# Patient Record
(demographics unavailable — no encounter records)

---

## 2024-10-21 NOTE — HISTORY OF PRESENT ILLNESS
[FreeTextEntry1] : follow up [de-identified] : Mr. Kai Zuluaga is a 41 yo male presents today for routine follow up. Pt is due for his comprehensive labs, to be ordered today recommended to return next month for CPE visit. Pt continues to also follow up with psychiatrist, Dr. Bernard Burnett, currently managing his hx of anxiety/panic attack, bipolar II, currently doing well, symptoms stable. Pt seeks also lab work to check Depakote level. Pt today denies, fever, chills, n/v/c/d. Pt interested in flu vaccine today.

## 2024-10-21 NOTE — REVIEW OF SYSTEMS
[Negative] : Heme/Lymph [Fever] : no fever [Chills] : no chills [Fatigue] : no fatigue [Earache] : no earache [Hearing Loss] : no hearing loss [Nosebleeds] : no nosebleeds [Postnasal Drip] : no postnasal drip [Nasal Discharge] : no nasal discharge [Sore Throat] : no sore throat [Hoarseness] : no hoarseness [Joint Pain] : no joint pain [Joint Stiffness] : no joint stiffness [Muscle Pain] : no muscle pain [Back Pain] : no back pain [Itching] : no itching [Skin Rash] : no skin rash [Headache] : no headache [Insomnia] : no insomnia [Anxiety] : no anxiety [Depression] : no depression

## 2024-10-21 NOTE — PHYSICAL EXAM
[No Acute Distress] : no acute distress [Well Nourished] : well nourished [EOMI] : extraocular movements intact [Normal Outer Ear/Nose] : the outer ears and nose were normal in appearance [Supple] : supple [Clear to Auscultation] : lungs were clear to auscultation bilaterally [Normal S1, S2] : normal S1 and S2 [No Edema] : there was no peripheral edema [Non Tender] : non-tender [No Spinal Tenderness] : no spinal tenderness [No Joint Swelling] : no joint swelling [No Rash] : no rash [Normal Gait] : normal gait [Normal Affect] : the affect was normal

## 2024-10-21 NOTE — ASSESSMENT
[FreeTextEntry1] : Approximately 30 minutes was involved in this patient's care, including but not limited to preparing to see the patient, reviewing and obtaining the past and interval history, including medications, reviewing relevant testing, documenting clinical information, ordering of appropriate medications and testing, and coordinating medical care, including communicating with professionals involved in the care of the patient.

## 2024-10-23 NOTE — REVIEW OF SYSTEMS
[Fever] : no fever [Chills] : no chills [Fatigue] : no fatigue [Earache] : no earache [Hearing Loss] : no hearing loss [Nosebleeds] : no nosebleeds [Postnasal Drip] : no postnasal drip [Nasal Discharge] : no nasal discharge [Sore Throat] : no sore throat [Hoarseness] : no hoarseness [Joint Pain] : no joint pain [Joint Stiffness] : no joint stiffness [Muscle Pain] : no muscle pain [Back Pain] : no back pain [Itching] : no itching [Skin Rash] : no skin rash [Headache] : no headache [Insomnia] : no insomnia [Anxiety] : no anxiety [Depression] : no depression [Negative] : Heme/Lymph

## 2024-10-23 NOTE — ASSESSMENT
[FreeTextEntry1] : completed physical exam, blood work and urinalysis ordered today, will follow up accordingly. HCM: due for new colonoscopy, referral to GI provided pt seeks referral to dermatology for skin cancer screening and referral to pulmonary, hx of smoker, ground glass opacities in the lung, referral for both provided vaccines: flu vaccine up to date. covid vaccine pharmacy Tdap deferred vitals stable, ecg stable no acute new health concerns today.

## 2024-10-23 NOTE — HISTORY OF PRESENT ILLNESS
[FreeTextEntry1] : comprehensive visit [de-identified] : Mr. Kai Zuluaga is a 41 yo male presents today for annual comprehensive exam and labs. Lab script already ordered, pending completions.  Pt today denies any fever, chills, n/v/c/d

## 2024-10-23 NOTE — ASSESSMENT
[FreeTextEntry1] : NOEMI SANTOYO is a 40 year old man with recurrent b/l inflammatory eye disease and has been unable to taper off ocular steroids, prolonged AM stiffness and pain in low back with prolonged immobility, sciatica and cervical radiculopathy, SI joint TTP and b/l Achilles TTP, some small joint worsening arthralgias and synovitis. Additionally with dry eyes by Schirmer, dry skin, chronic diarrhea. Serological w/u negative for lupus/Sjogrens, ESR/CRP negative. MRI L spine with DJD which could be cause of his sx, asymmetrical SI joint involvement as well but not classic pattern for inflammatory axial disease. However given overall worsening clinical status I am suspicious for an inflammatory process, possibly seronegative arthritis/eye disease and dry eyes may be exacerbated by chronic inflammation and steroid drops. GI sx appear related to IBS not IBD.   # seronegative inflammatory process with peripheral and axial involvement as well as iritis in the past. Not seen in some time so off meds and presently not symptomatic.  - will monitor off meds (MTX 2/2 LFTs prior, Humira) at present time  - repeat inflammatory markers with PMD labs and check Quant/Hep panel in case DMARDs/biologics need to be resumed  - c/w stretching for low back pain sx - MRI L shoulder for ongoing pain/slightly limited ROM 2/2 injury reviewed - no inflammatory features - f/u ortho   # Sicca/ clinical Sjogrens  - c/w conservative measures for sicca PRN -- AT q6 prn for eyes, warm compresses, gel drops QHS, humidified air QHS. Biotene spray, sugar free lozenges, PO hydration for mouth. Ceramide containing lotion post-bathing to retain moisture.  - q6 months dental to monitor mouth  - f/u optho as recommended  RTC 12 months, sooner if [place] inflammatory sx recur

## 2024-10-23 NOTE — HEALTH RISK ASSESSMENT
[Good] : ~his/her~  mood as  good [Yes] : Yes [Monthly or less (1 pt)] : Monthly or less (1 point) [1 or 2 (0 pts)] : 1 or 2 (0 points) [Never (0 pts)] : Never (0 points) [No] : In the past 12 months have you used drugs other than those required for medical reasons? No [No falls in past year] : Patient reported no falls in the past year [0] : 2) Feeling down, depressed, or hopeless: Not at all (0) [PHQ-2 Negative - No further assessment needed] : PHQ-2 Negative - No further assessment needed [Never] : Never [YES] : Yes [Have you attended a firearm safety workshop or class?] : A firearm safety workshop or class has been attended. [Patient reported colonoscopy was abnormal] : Patient reported colonoscopy was abnormal [HIV test declined] : HIV test declined [Hepatitis C test declined] : Hepatitis C test declined [None] : None [With Family] : lives with family [Employed] : employed [College] : College [Significant Other] : lives with significant other [Sexually Active] : sexually active [Feels Safe at Home] : Feels safe at home [Fully functional (bathing, dressing, toileting, transferring, walking, feeding)] : Fully functional (bathing, dressing, toileting, transferring, walking, feeding) [Fully functional (using the telephone, shopping, preparing meals, housekeeping, doing laundry, using] : Fully functional and needs no help or supervision to perform IADLs (using the telephone, shopping, preparing meals, housekeeping, doing laundry, using transportation, managing medications and managing finances) [Smoke Detector] : smoke detector [Carbon Monoxide Detector] : carbon monoxide detector [Safety elements used in home] : safety elements used in home [Seat Belt] :  uses seat belt [Sunscreen] : uses sunscreen [With Patient/Caregiver] : , with patient/caregiver [Reviewed no changes] : Reviewed, no changes [Name: ___] : Health Care Proxy's Name: [unfilled]  [Relationship: ___] : Relationship: [unfilled] [Aggressive treatment] : aggressive treatment [de-identified] : social [Audit-CScore] : 1 [[id number]] : 0 [Are there any unlocked firearms stored in your household?] : No unlocked firearms in the household. [Are there any firearms stored in your household that are loaded?] : No firearms are stored in the household loaded. [Are there any children in your household?] : No children are in the household. [Has anyone in the household been feeling low/depressed/been struggling?] : No one in the household has been feeling low/depressed/been struggling. [Change in mental status noted] : No change in mental status noted [Language] : denies difficulty with language [Behavior] : denies difficulty with behavior [Learning/Retaining New Information] : denies difficulty learning/retaining new information [Handling Complex Tasks] : denies difficulty handling complex tasks [Reasoning] : denies difficulty with reasoning [Spatial Ability and Orientation] : denies difficulty with spatial ability and orientation [High Risk Behavior] : no high risk behavior [Reports changes in hearing] : Reports no changes in hearing [Reports changes in vision] : Reports no changes in vision [Reports changes in dental health] : Reports no changes in dental health [ColonoscopyDate] : 10/2021 [ColonoscopyComments] : sessile polyp, referral to GI for new colonoscopy.  [de-identified] : dentist twice a year [AdvancecareDate] : 10/23/2024

## 2024-10-23 NOTE — REVIEW OF SYSTEMS
[Arthralgias] : arthralgias [Joint Pain] : joint pain [Joint Stiffness] : joint stiffness [As Noted in HPI] : as noted in HPI [Fever] : no fever [Chills] : no chills [Dry Eyes] : dryness of the eyes [Joint Swelling] : no joint swelling [Negative] : ENT [FreeTextEntry3] : Now more tear production [FreeTextEntry7] : IBS-D

## 2024-10-23 NOTE — HISTORY OF PRESENT ILLNESS
[FreeTextEntry1] : NOEMI SANTOYO is a 38 year old man who presents for rheum evaluation of below sx --   + chronic dry eyes, 10% tear production in R eye, 50% in L eye by Schirmer test, needs eye drops daily  + recurrent b/l chronic iritis, most recently in May 2021, still on tapering eye drops + L sided salivary gland fullness, painless x 2 months  + dry mouth, not as severe as eyes  + chronically dry skin, recent bumpy rash, no inflammatory rashes, no psoriasis  + diarrhea 2-3x every morning x few years, stable, never needed to see GI, + mild GERD, no other GI sx  + diffuse mild arthralgias, Achilles b/l involved  + low back/pelvis and neck prolonged AM stiffness and gelling in lower back, ROM intact, L sided cervical radiculopathy intermittently  + arrhythmia, has been worked up by cards, on BB  + also with chronic sinusitis but remainder of vasculitis ROS negative and improved with conservative measures, no epistaxis or hemoptysis   SLE ROS negative for alopecia, oral ulcers, malar rash, photosensitivity, SOB, chest pain, serositis, abd pain, dysuria, hematuria, rash, hematologic abnormalities, Raynauds. APLS ROS - no thrombotic events.   Inflammatory arthritis ROS negative for symmetrical peripheral joint synovitis, psoriasis/ rashes, diagnosed IBD.   Labs - Negative ESR/CRP, GREGOR, dsDNA, MARSHA, dsDNA, C3/4, Sjogrens, HIV, thyroid Abs, sUA, HCV, UA, RF/CCP MRI LS spine -- mild L5-s1 DJD, nonspecific signal abnormality over R upper SI, L lower SI. R hip minimal OA without effusion.   ----------- 8/11/21 -- Mid-late day persistent low back pain. Worse with prolonged sitting. MSK pain over upper back but intact C spine ROM, no peripheral synovitis. Combination of NSAIDs and muscle relaxer helps but reports tizanidine dose is too low for him and mobic causes some sedation. Has used ibuprofen in the past without sedation or GI SE. No rashes, no further eye inflammation, ongoing GI complaints but has not seen GI yet.   12/21/21-- Punctal plugs partially helping but continued decrease of tear production, ongoing inflammatory changes in b/l eyes and remains on steroid eye drops. Some SQ nodules vs blisters over lateral aspects of some PIPs, fluctuating. Stable LBP pain, saw ortho who found DJD, s/p SELAM without much improvement. Ongoing and worsening SI joint pain, R >>L. Ongoing b/l ankle/Achilles pain and some newer pain in b/l knees, remains without overt effusions/synovitis.   1/25/22 -- Worsening HA approx 2 days after MTX doses, otherwise tolerating well, not sure if it is helping yet. Remains with diffuse arthralgias, back/SI remains most symptomatic even with conservative measures and current meds. Saw Dr Loera recently, colonoscopy without IBD, started on Viberzi for IBS-D with some improvement.   5/6/22 -- Ongoing and worsening LBP since last visit, happening with prolonged sitting as well, steroids provide some improvement but gaining weight 2/2 this, not able to use muscle relaxers with frequency 2/2 sedation. MTX is being tolerated but minimally helpful. Some b/l wrist pain and R lateral epicondylitis x few days. Punctal plugs helping with eyes but occasionally finds it hard to focus, has missed a few optho appointments. Off Viberzi as did not feel it helped. No extra-articular inflammatory sx.   9/27/22 -- Lost insurance a little over 2 months ago so went off all meds. Developed covid while off meds -- mainly GI sx, did not have to be in the hospital, recovered, then developed sinus infection/sore throat/ persistent chills without fevers/myalgias persistent x weeks, now starting to resolve s/p ABx. LBP ongoing, sicca ongoing tho slightly better, remains without [place] synovitis.   9/7/23 -- Ongoing LBP, no inflammatory sx, gene be going for repeat SELAM. Achilles tendonitis chronically but not limiting ADLs/walking. AM stiffness <10 min. Ocular sicca stable.  10/23/24 -- Off all rheum meds for at least 9 months, doesn't feel much worse in the aspects we had been attempting to treat with the meds. 2 episodes of ocular pain since last visit, used 3-4 days of leftover eye drop Rx with benefit, no vision loss, notes improved tears, some mornings getting a lot of tears. Achilles pain not worsened. Remainder of inflammatory ROS negative. Noted GI sx improvement since on Ozempic with MD in [place].  Seeing PMD later today.  Has not had labs since last year.

## 2024-10-23 NOTE — PHYSICAL EXAM
[General Appearance - Alert] : alert [General Appearance - In No Acute Distress] : in no acute distress [General Appearance - Well Nourished] : well nourished [PERRL With Normal Accommodation] : pupils were equal in size, round, and reactive to light [Extraocular Movements] : extraocular movements were intact [Outer Ear] : the ears and nose were normal in appearance [Both Tympanic Membranes Were Examined] : both tympanic membranes were normal [Nasal Cavity] : the nasal mucosa and septum were normal [Oropharynx] : the oropharynx was normal [Neck Appearance] : the appearance of the neck was normal [Neck Cervical Mass (___cm)] : no neck mass was observed [Edema] : there was no peripheral edema [No CVA Tenderness] : no ~M costovertebral angle tenderness [Abnormal Walk] : normal gait [Nail Clubbing] : no clubbing  or cyanosis of the fingernails [Musculoskeletal - Swelling] : no joint swelling seen [Motor Tone] : muscle strength and tone were normal [] : no rash [No Focal Deficits] : no focal deficits [Oriented To Time, Place, And Person] : oriented to person, place, and time [Impaired Insight] : insight and judgment were intact [Affect] : the affect was normal [Respiration, Rhythm And Depth] : normal respiratory rhythm and effort [FreeTextEntry1] : No synovitis/ effusions, no Achilles TTP, ROM diffusely intact except in L shoulder where there is partial limitation 2/2 pain

## 2024-10-28 NOTE — PROCEDURE
[de-identified] : Using sterile technique, 2cc of depomedrol (40mg/ml) and 3cc of 1% plain lidocaine was drawn up into a sterile 5cc syringe.  The posterior lateral corner of the left shoulder was then sterilely prepped with chlorhexidine and ethylene chloride spray was used as an anesthetic prior to injection.  The subacromial space was imaged with the Maurice Lumify ultrasound probe.  The depomedrol/lidocaine mixture was injected into the subacromial space under ultrasound guidance.  A spot image of the injection was saved..  The patient tolerated the procedure well without difficulty.  The patient was given instructions on the use of ice and anti-inflammatories post injection site soreness.

## 2024-10-28 NOTE — HISTORY OF PRESENT ILLNESS
[de-identified] : This patient presents today for follow-up regarding left shoulder strain.  Patient continues to complain of significant pain which she feels is getting worse.  Did have an MRI to evaluate for rotator cuff or labral tearing.  Presents today to review the MRI results.  Pain level is 2-4 out of 10.  Pain worse with activity such as reaching behind the back.  Notes occasional clicking and popping with range of motion.  Does note some weakness intermittently at times.

## 2024-10-28 NOTE — PHYSICAL EXAM
[de-identified] : The patient appears well nourished  and in no apparent distress.  The patient is alert and oriented to person, place, and time.   Affect and mood appear normal.    The head is normocephalic and atraumatic.  The eyes reveal normal sclera and extra ocular muscles are intact.   The neck appears normal with no jugular venous distention or masses noted.   Skin shows normal turgor with no evidence of eczema or psoriasis.  No respiratory distress noted.  The patient ambulates with a normal gait.  The left shoulder has mild loss of range of motion.  Positive impingement sign and positive Gale sign.  Rotator cuff strength normal.  There is no tenderness to palpation.   There is no soft tissue swelling.  There is no eccyhmosis.  There is no warmth or erythema.    There is no instabililty on exam to anterior drawer or load and shift.  No lymphadenopathy or edema is noted.  Pulses and capillary refill are normal.  There is no muscular atrophy.  Strength and sensation are intact distally.   [de-identified] : MRI was reviewed.  There is evidence of a tearing of the posterior superior labrum with biceps tendinosis.

## 2024-10-28 NOTE — DISCUSSION/SUMMARY
[de-identified] : The patient presents today for evaluation of follow-up regarding left shoulder strain.  We did obtain an MRI to rule out labral or rotator cuff tear.  The MRI shows evidence of a SLAP tear with biceps tendinitis.  I discussed the diagnosis and treatment recommendations.  I would like to try an injection into the subacromial space which was performed on today's visit.  Will see him back in 1 week for follow-up and reevaluation.  If he has significant improvement we will start him on a course of therapy and follow him.  If he does not have improvement he may require arthroscopic evaluation of the SLAP tear and possible biceps tenodesis.  At least 30 minutes was spent performing the evaluation and management on today's office visit.  This includes but is not limited to preparing to see patient including review of any test results or outside medical records, obtaining and/or reviewing separately obtained history, performing examination and evaluation, counseling and educating the patient on their diagnosis and treatment recommendations, ordering medications, tests, or procedures, documenting clinical information in the electronic health record, independently interpreting results (not separately reported) and communicating results to the patient, and coordination of care.

## 2024-11-05 NOTE — HISTORY OF PRESENT ILLNESS
[de-identified] : This patient presents today for follow-up regarding left shoulder strain and SLAP tear.  Patient had a cortisone injection approxi-1 week ago and presents today for follow-up.  He feels the injection did not help him much at all.  Continues to complain of pain about the shoulder and is noting loss of motion and stiffness.  Pain level is 4 out of 10.  Denies radicular symptoms numbness and tingling.  Pain worse with activities such as reaching pulling pushing or lifting.

## 2024-11-05 NOTE — PHYSICAL EXAM
[de-identified] : The patient appears well nourished  and in no apparent distress.  The patient is alert and oriented to person, place, and time.   Affect and mood appear normal.    The head is normocephalic and atraumatic.  The eyes reveal normal sclera and extra ocular muscles are intact.   The neck appears normal with no jugular venous distention or masses noted.   Skin shows normal turgor with no evidence of eczema or psoriasis.  No respiratory distress noted.  The patient ambulates with a normal gait.  The left shoulder has increased loss of motion.  His forward flexion is 150 degrees, abduction 80 degrees, abduction internal rotation 20 degrees, external rotation 45 versus 90 on the contralateral side, internal rotation to the lower lumbar spine.  Positive impingement sign and positive Gale sign.  Rotator cuff strength normal.  There is no tenderness to palpation.   There is no soft tissue swelling.  There is no eccyhmosis.  There is no warmth or erythema.    There is no instabililty on exam to anterior drawer or load and shift.  No lymphadenopathy or edema is noted.  Pulses and capillary refill are normal.  There is no muscular atrophy.  Strength and sensation are intact distally.

## 2024-11-05 NOTE — DISCUSSION/SUMMARY
[de-identified] : The patient presents today for follow-up regarding SLAP tear of the left shoulder.  Patient did have a subacromial Jex about a week ago with no improvement.  His physical exam today reveals he is developing adhesive capsulitis.  I discussed the diagnosis with the patient as well as treatment recommendations.  I recommend patient start a course of physical therapy for capsular stretching.  I will see him back in 1 month for follow-up and reevaluation.  Depending upon how he is doing after the month of therapy we will consider continued conservative treatment versus examination under anesthesia manipulation possible capsule release and also evaluation of the SLAP tear.  On today's visit he was given a prescription for therapy.  At least 20 minutes was spent performing the evaluation and management on today's office visit.  This includes but is not limited to preparing to see patient including review of any test results or outside medical records, obtaining and/or reviewing separately obtained history, performing examination and evaluation, counseling and educating the patient on their diagnosis and treatment recommendations, ordering medications, tests, or procedures, documenting clinical information in the electronic health record, independently interpreting results (not separately reported) and communicating results to the patient, and coordination of care.

## 2024-12-23 NOTE — DISCUSSION/SUMMARY
[de-identified] : This patient presents today for follow-up regarding left shoulder strain with adhesive capsulitis.  Patient is noting some slight improvement however his physical exam does not reveal significant improvement in his range of motion.  I recommend he continue physical therapy at least twice a week and do home exercises on a daily basis.  I will see him back in 1 month for follow-up and reevaluation.  At least 20 minutes was spent performing the evaluation and management on today's office visit.  This includes but is not limited to preparing to see patient including review of any test results or outside medical records, obtaining and/or reviewing separately obtained history, performing examination and evaluation, counseling and educating the patient on their diagnosis and treatment recommendations, ordering medications, tests, or procedures, documenting clinical information in the electronic health record, independently interpreting results (not separately reported) and communicating results to the patient, and coordination of care.

## 2024-12-23 NOTE — HISTORY OF PRESENT ILLNESS
[de-identified] : This patient presents today for follow-up and reevaluation regarding left shoulder strain and adhesive capsulitis.  He has been going to therapy but not that often as he was in [place].  He is been doing some home exercises well.  He presents today for follow-up and reevaluation.  Pain level 4 out of 10.  Denies numbness and tingling or radicular symptoms.  He presents today for reevaluation

## 2024-12-23 NOTE — PHYSICAL EXAM
[de-identified] : The patient appears well nourished  and in no apparent distress.  The patient is alert and oriented to person, place, and time.   Affect and mood appear normal.    The head is normocephalic and atraumatic.  The eyes reveal normal sclera and extra ocular muscles are intact.   The neck appears normal with no jugular venous distention or masses noted.   Skin shows normal turgor with no evidence of eczema or psoriasis.  No respiratory distress noted.  The patient ambulates with a normal gait.  The left shoulder has increased loss of motion.  His forward flexion is 150 degrees, abduction 80 degrees, abduction internal rotation 20 degrees, external rotation 45 versus 90 on the contralateral side, internal rotation to the thoracolumbar junction.  Positive impingement sign and positive Gale sign.  Rotator cuff strength normal.  There is no tenderness to palpation.   There is no soft tissue swelling.  There is no eccyhmosis.  There is no warmth or erythema.    There is no instabililty on exam to anterior drawer or load and shift.  No lymphadenopathy or edema is noted.  Pulses and capillary refill are normal.  There is no muscular atrophy.  Strength and sensation are intact distally.

## 2024-12-23 NOTE — REASON FOR VISIT
[Follow-Up Visit] : a follow-up visit for [Shoulder Pain] : shoulder pain [FreeTextEntry2] : follow up evaluation of adhesive capsulitis of the left shoulder/SLAP tear of shoulder.

## 2025-01-22 NOTE — PLAN
[No] : No [FreeTextEntry4] : meeting goals of care.  [FreeTextEntry5] : Continue VPA and Latuda for mood, and Klonopin for anxiety and sleep, helped with long flight, pt does not take this regularly.

## 2025-01-22 NOTE — PHYSICAL EXAM
[Well groomed] : well groomed [Cooperative] : cooperative [Euthymic] : euthymic [Clear] : clear [Linear/Goal Directed] : linear/goal directed [None] : none [None Reported] : none reported [Average] : average [WNL] : within normal limits [FreeTextEntry1] : weight less than on last exam.  [de-identified] : constricted as a baseline

## 2025-01-22 NOTE — REASON FOR VISIT
[Continuing, patient seen in-person within last 12 months] : Telehealth services are continuing as patient has been seen in-person within last 12 months. [Telehealth (audio & video) - Individual/Group] : This visit was provided via telehealth using real-time 2-way audio visual technology. [Medical Office: ([facility])___] : The provider was located at the medical office in [unfilled]. [Home] : The patient, [unfilled], was located at home, [unfilled], at the time of the visit. [Patient's space is appropriate for telehealth and maintains privacy/confidentiality.] : Patient's space is appropriate for telehealth and maintains privacy/confidentiality. [Participant(s) identity verified] : Participant(s) identity verified. [For new patient(s) – practitioner identifies themselves to participants] : Practitioner identifies themselves to participants. [Verbal consent obtained from patient/other participant(s)] : Verbal consent for telehealth/telephonic services obtained from patient/other participant(s) [Patient] : Patient [FreeTextEntry1] : Pt here for management of mood disorder.

## 2025-01-22 NOTE — HISTORY OF PRESENT ILLNESS
[FreeTextEntry1] : Pt reports that he socially used alcohol, but not to excess.  Pt with some change in circadian rhythms due to travel.  Pt denied AH/VH/delusions, denied s/e, denied racing thoughts.  Pt denied depression and met treatment goals of relaxing and self care.  Pt is medication adherent, will continue current management, clinically stable.

## 2025-03-26 NOTE — PHYSICAL EXAM
[Well groomed] : well groomed [Cooperative] : cooperative [Euthymic] : euthymic [Clear] : clear [Linear/Goal Directed] : linear/goal directed [None] : none [None Reported] : none reported [Average] : average [WNL] : within normal limits [FreeTextEntry1] : weight less than on last exam.  [de-identified] : constricted as a baseline

## 2025-03-26 NOTE — PHYSICAL EXAM
[Well groomed] : well groomed [Cooperative] : cooperative [Euthymic] : euthymic [Clear] : clear [Linear/Goal Directed] : linear/goal directed [None] : none [None Reported] : none reported [Average] : average [WNL] : within normal limits [FreeTextEntry1] : weight less than on last exam.  [de-identified] : constricted as a baseline

## 2025-03-26 NOTE — PLAN
[No] : No [Medication education provided] : Medication education provided. [Rationale for medication choices, possible risks/precautions, benefits, alternative treatment choices, and consequences of non-treatment discussed] : Rationale for medication choices, possible risks/precautions, benefits, alternative treatment choices, and consequences of non-treatment discussed with patient/family/caregiver  [FreeTextEntry4] : Meeting goals of care but has significant psychosocial stress.  [FreeTextEntry5] : Pt advised to follow up with PCP for labs, had slightly elevated LFT's in October of 2024, want to measure VPA level as well.  A	Y	N	B	12/30/2024	12/30/2024	clonazepam 0.5 mg tablet	60	30	Leslye Wiseman MD	[id number]	[facility] [place] [id number] Plan to get VPA level, have routine labs + sed rate done.  Continue Depakote 500 mg daily for mood stabilization.  Pt to increase Klonopin to 1.5 mg at bedtime. for sleep and anxiety Continue Latuda, but increase dose to 40 mg. for adjunct to VPA for mood stabilization.

## 2025-03-26 NOTE — PLAN
[No] : No [Medication education provided] : Medication education provided. [Rationale for medication choices, possible risks/precautions, benefits, alternative treatment choices, and consequences of non-treatment discussed] : Rationale for medication choices, possible risks/precautions, benefits, alternative treatment choices, and consequences of non-treatment discussed with patient/family/caregiver  [FreeTextEntry4] : Meeting goals of care but has significant psychosocial stress.  [FreeTextEntry5] : Pt advised to follow up with PCP for labs, had slightly elevated LFT's in October of 2024, want to measure VPA level as well.  A	Y	N	B	12/30/2024	12/30/2024	clonazepam 0.5 mg tablet	60	30	Leslye Wiseman MD	[id number]	[facility] #[id number] Plan to get VPA level, have routine labs + sed rate done.  Continue Depakote 500 mg daily for mood stabilization.  Pt to increase Klonopin to 1.5 mg at bedtime. for sleep and anxiety Continue Latuda, but increase dose to 40 mg. for adjunct to VPA for mood stabilization.

## 2025-03-26 NOTE — HISTORY OF PRESENT ILLNESS
[FreeTextEntry1] : Pt reports his significant other is 10 weeks pregnant, he states he feels overwhelmed, he is feeling nauseous.  Pt reports he can fall asleep after taking Klonopin, sleeps for a few hours and then has interrupted sleep.  Pt reports he has been adherent with VPA. Pt states he had run out of Latuda for the past few days. He states heart racing, palpitations, and the sense of chest pressure has been consistent. Pt reports he has had a few depressive days where he wants to stay in bed and feels achy and "inflamed." Pt states he has stopped smoking, no other substance use issues. Pt is taking 1 full mg nightly of Klonopin.  [FreeTextEntry3] : Sertraline Lorazepam Gabapentin Topiramate Venlafaxine Abilify Buspirone Ambien

## 2025-04-24 NOTE — PHYSICAL EXAM
[Well groomed] : well groomed [Cooperative] : cooperative [Euthymic] : euthymic [Full] : full [Clear] : clear [Linear/Goal Directed] : linear/goal directed [None] : none [None Reported] : none reported [Average] : average [WNL] : within normal limits [de-identified] : at baseline

## 2025-04-24 NOTE — REASON FOR VISIT
[Patient preference] : as per patient preference [Continuity of care] : to ensure continuity of care [Continuing, patient seen in-person within last 12 months] : Telehealth services are continuing as patient has been seen in-person within last 12 months. [Telehealth (audio & video) - Individual/Group] : This visit was provided via telehealth using real-time 2-way audio visual technology. [Medical Office: ([facility])___] : The provider was located at the medical office in [unfilled]. [Home] : The patient, [unfilled], was located at home, [unfilled], at the time of the visit. [Patient's space is appropriate for telehealth and maintains privacy/confidentiality.] : Patient's space is appropriate for telehealth and maintains privacy/confidentiality. [Participant(s) identity verified] : Participant(s) identity verified. [Verbal consent obtained from patient/other participant(s)] : Verbal consent for telehealth/telephonic services obtained from patient/other participant(s) [Patient] : Patient [FreeTextEntry4] : 2:45 pm [FreeTextEntry5] : 3:00pm [FreeTextEntry2] : 03/26/2025 [FreeTextEntry1] : Pt here for management of medication.

## 2025-04-24 NOTE — REASON FOR VISIT
[Patient preference] : as per patient preference [Continuity of care] : to ensure continuity of care [Continuing, patient seen in-person within last 12 months] : Telehealth services are continuing as patient has been seen in-person within last 12 months. [Telehealth (audio & video) - Individual/Group] : This visit was provided via telehealth using real-time 2-way audio visual technology. [Medical Office: ([facility])___] : The provider was located at the medical office in [unfilled]. [Home] : The patient, [unfilled], was located at home, [unfilled], at the time of the visit. [Patient's space is appropriate for telehealth and maintains privacy/confidentiality.] : Patient's space is appropriate for telehealth and maintains privacy/confidentiality. [Participant(s) identity verified] : Participant(s) identity verified. [Verbal consent obtained from patient/other participant(s)] : Verbal consent for telehealth/telephonic services obtained from patient/other participant(s) [Patient] : Patient [FreeTextEntry4] : 2:45 pm [FreeTextEntry5] : 3:00pm [FreeTextEntry2] : 03/26/2025 [FreeTextEntry1] : Pt here for management of medication.

## 2025-04-24 NOTE — PLAN
[No] : No [Medication education provided] : Medication education provided. [Rationale for medication choices, possible risks/precautions, benefits, alternative treatment choices, and consequences of non-treatment discussed] : Rationale for medication choices, possible risks/precautions, benefits, alternative treatment choices, and consequences of non-treatment discussed with patient/family/caregiver  [FreeTextEntry4] : Meeting goals of care with symptoms resolved.  [FreeTextEntry5] : Depakote 250 mg bid ER bid for mood stabilization.  Klonopin 0.5 mg tid for anxiety and sleep.  Lurasidone 40 mg for mood stabilization.

## 2025-04-24 NOTE — PHYSICAL EXAM
[Well groomed] : well groomed [Cooperative] : cooperative [Euthymic] : euthymic [Full] : full [Clear] : clear [Linear/Goal Directed] : linear/goal directed [None] : none [None Reported] : none reported [Average] : average [WNL] : within normal limits [de-identified] : at baseline

## 2025-04-24 NOTE — HISTORY OF PRESENT ILLNESS
[FreeTextEntry1] : Pt reports his significant other is progressing well in her pregnancy.  Pt was asked to follow up with Dr. Seymour regarding inflammation. Pt reports he has a "lump" on the side of his finger.  Was asked to get labs and sed rate the last visit. He reports he has a busy work schedule. Pt reports that the palpitations, chest discomfort, anxiety and sleep disturbance have all dissipated. Denied racing thoughts, irritability, agitation, rest of psychiatric ROS either unremarkable or at his euthymic baseline.  Pt denied any side effects of medications, pt reports his weight is good, has been stable with Lurasidone, denied EPS, or akathisia.  Pt reports he lost 30 lbs. deliberately on Ozempic. Pt is on the lowest dose.  [FreeTextEntry3] : Sertraline Lorazepam Gabapentin Topiramate Venlafaxine Abilify Buspirone Ambien

## 2025-06-25 NOTE — PHYSICAL EXAM
[Well groomed] : well groomed [Cooperative] : cooperative [Euthymic] : euthymic [Full] : full [Clear] : clear [Linear/Goal Directed] : linear/goal directed [None] : none [None Reported] : none reported [Average] : average [WNL] : within normal limits [de-identified] : at baseline

## 2025-06-25 NOTE — PLAN
[No] : No [Medication education provided] : Medication education provided. [Rationale for medication choices, possible risks/precautions, benefits, alternative treatment choices, and consequences of non-treatment discussed] : Rationale for medication choices, possible risks/precautions, benefits, alternative treatment choices, and consequences of non-treatment discussed with patient/family/caregiver  [FreeTextEntry4] : Meeting goals of care, continue current management.  No symptoms of VPA toxicity, pt to get labs with next visit.  [FreeTextEntry5] : I stop checked.  A	Y	N	B	05/13/2025	05/14/2025	clonazepam 0.5 mg tablet	90	30	Leslye Wiseman MD	[id number] Pt stable on current regimen.  Continue Latuda 40 mg, and Depakote  mg bid for mood stabilization.  Continue Klonopin for sleep and generalized anxiety.

## 2025-06-25 NOTE — REASON FOR VISIT
[Patient preference] : as per patient preference [Continuity of care] : to ensure continuity of care [Continuing, patient seen in-person within last 12 months] : Telehealth services are continuing as patient has been seen in-person within last 12 months. [Telehealth (audio & video) - Individual/Group] : This visit was provided via telehealth using real-time 2-way audio visual technology. [Medical Office: ([facility])___] : The provider was located at the medical office in [unfilled]. [Home] : The patient, [unfilled], was located at home, [unfilled], at the time of the visit. [Patient's space is appropriate for telehealth and maintains privacy/confidentiality.] : Patient's space is appropriate for telehealth and maintains privacy/confidentiality. [Participant(s) identity verified] : Participant(s) identity verified. [Verbal consent obtained from patient/other participant(s)] : Verbal consent for telehealth/telephonic services obtained from patient/other participant(s) [Patient] : Patient [FreeTextEntry4] : 3:00 pm [FreeTextEntry1] : Pt is a follow up visit.

## 2025-06-25 NOTE — HISTORY OF PRESENT ILLNESS
[FreeTextEntry1] : Pt has been able to maintain weight off Ozempic.  Pt denied any side effects, no abnormal movements, no sexual s/e.  Pt denied mood symptoms, no panic attacks, no shortness of breath, no skipped beats.  Pt denied any agoraphobic or avoidant behavior, denies suicidal or homicidal ideation, intent or plan.  Denied irritability, agitation, poor sleep, did continue Klonopin at 1.5 mg for sleep and anxiety.  No overuse misuse or diversion of controlled substance suspected.

## 2025-07-03 NOTE — HISTORY OF PRESENT ILLNESS
[FreeTextEntry1] : follow up for lab work [de-identified] : Mr. Kai Zuluaga is a 40 yo male presents today for routine for labs. Pt continues to follow up with psychiatrist, Dr. Bernard Burnett, cardiologist Dr. Garcia, and Dr. Sloan, Rheumatologist. Pt currently maintained on Depakote and Lurasidone, for bipolar II disorder, clonazepam prn for anxiety. Pt was advised to have labs completed, and thus here for evaluation.